# Patient Record
Sex: FEMALE | URBAN - METROPOLITAN AREA
[De-identification: names, ages, dates, MRNs, and addresses within clinical notes are randomized per-mention and may not be internally consistent; named-entity substitution may affect disease eponyms.]

---

## 2024-04-17 ENCOUNTER — OV NP (OUTPATIENT)
Dept: URBAN - METROPOLITAN AREA CLINIC 107 | Facility: CLINIC | Age: 54
End: 2024-04-17
Payer: COMMERCIAL

## 2024-04-17 ENCOUNTER — LAB (OUTPATIENT)
Dept: URBAN - METROPOLITAN AREA CLINIC 107 | Facility: CLINIC | Age: 54
End: 2024-04-17

## 2024-04-17 VITALS
TEMPERATURE: 98.1 F | SYSTOLIC BLOOD PRESSURE: 127 MMHG | HEART RATE: 70 BPM | DIASTOLIC BLOOD PRESSURE: 79 MMHG | HEIGHT: 61 IN | WEIGHT: 230 LBS | BODY MASS INDEX: 43.43 KG/M2

## 2024-04-17 DIAGNOSIS — R10.11 RIGHT UPPER QUADRANT ABDOMINAL PAIN: ICD-10-CM

## 2024-04-17 DIAGNOSIS — K21.9 GASTROESOPHAGEAL REFLUX DISEASE, UNSPECIFIED WHETHER ESOPHAGITIS PRESENT: ICD-10-CM

## 2024-04-17 DIAGNOSIS — R19.4 ALTERED BOWEL HABITS: ICD-10-CM

## 2024-04-17 PROBLEM — 235595009: Status: ACTIVE | Noted: 2024-04-17

## 2024-04-17 PROCEDURE — 99204 OFFICE O/P NEW MOD 45 MIN: CPT | Performed by: INTERNAL MEDICINE

## 2024-04-17 RX ORDER — SEMAGLUTIDE 2.68 MG/ML
INJECT 2MG UNDER THE SKIN EVERY WEEK INJECTION, SOLUTION SUBCUTANEOUS
Qty: 3 UNSPECIFIED | Refills: 1 | Status: ACTIVE | COMMUNITY

## 2024-04-17 RX ORDER — ROSUVASTATIN CALCIUM 10 MG/1
TABLET, FILM COATED ORAL
Qty: 90 TABLET | Status: ACTIVE | COMMUNITY

## 2024-04-17 RX ORDER — GABAPENTIN 300 MG/1
TAKE ONE CAPSULE BY MOUTH THREE TIMES A DAY CAPSULE ORAL
Qty: 270 UNSPECIFIED | Refills: 0 | Status: ACTIVE | COMMUNITY

## 2024-04-17 RX ORDER — METFORMIN HYDROCHLORIDE 1000 MG/1
TABLET ORAL
Qty: 180 TABLET | Status: ACTIVE | COMMUNITY

## 2024-04-17 RX ORDER — BENZONATATE 200 MG/1
TAKE 1 CAPSULE BY MOUTH THREE TIMES DAILY AS NEEDED FOR COUGH FOR 7 DAYS CAPSULE ORAL
Qty: 21 EACH | Refills: 0 | Status: ACTIVE | COMMUNITY

## 2024-04-17 RX ORDER — DICYCLOMINE HYDROCHLORIDE 20 MG/1
TAKE ONE TABLET BY MOUTH FOUR TIMES A DAY AS NEEDED FOR ABDOMINAL PAIN TABLET ORAL
Qty: 20 UNSPECIFIED | Refills: 0 | Status: ACTIVE | COMMUNITY

## 2024-04-17 RX ORDER — DIPHENOXYLATE HYDROCHLORIDE AND ATROPINE SULFATE 2.5; .025 MG/1; MG/1
TABLET ORAL
Qty: 20 TABLET | Status: ACTIVE | COMMUNITY

## 2024-04-17 NOTE — HPI-OTHER HISTORIES
CT abdomen and pelvis with contrast 3/21/2024 fatty liver. CT otherwise normal. Ultrasound right upper quadrant 3/21/2024. Stones and sludge within gallbladder without wall thickening. Dilated CBD measuring up to 1 suspicious for choledocholithiasis. Suggest correlation with labs.  Labs 3/21/2024. CBC:WBC 11.6, Hgb normal at 13.5 CMP: Glucose 116. Normal lipase.

## 2024-04-17 NOTE — HPI-TODAY'S VISIT:
53-year-old female new to the clinic referred by Dr. Mesa to Dr. Mejias for screening colonoscopy.  She is here in clinic today for diarrhea.   A copy of this note will be sent to the referring provider.   Was in the ER at [facility] 3/21/2024 with abdominal pain and diarrhea. Pain epigastric and right upper quadrant. Was discharged home given prescription for pantoprazole 40 mg daily.  Past medical history of type 2 diabetes mellitus, hyperlipidemia, obesity. She reports she does not have a gallbladder.    She reports right upper pain has resolved last week.  Occasional gas. No further diarrhea.  She is off Ozempic and metformin currently.  No melena or hematochezia. Reports chronic GERD. No dysphagia, nausea or vomiting.  No CP, SOB, oxygen use, palpitations, syncope, near-syncope or problems with anesthesia previously. Denies blood thinners.   No prior EGD or colonoscopy.

## 2024-05-15 ENCOUNTER — TELEPHONE ENCOUNTER (OUTPATIENT)
Dept: URBAN - METROPOLITAN AREA SURGERY CENTER 25 | Facility: SURGERY CENTER | Age: 54
End: 2024-05-15

## 2024-05-28 ENCOUNTER — OFFICE VISIT (OUTPATIENT)
Dept: URBAN - METROPOLITAN AREA SURGERY CENTER 25 | Facility: SURGERY CENTER | Age: 54
End: 2024-05-28

## 2024-06-10 ENCOUNTER — OFFICE VISIT (OUTPATIENT)
Dept: URBAN - METROPOLITAN AREA SURGERY CENTER 25 | Facility: SURGERY CENTER | Age: 54
End: 2024-06-10